# Patient Record
Sex: FEMALE | Race: BLACK OR AFRICAN AMERICAN
[De-identification: names, ages, dates, MRNs, and addresses within clinical notes are randomized per-mention and may not be internally consistent; named-entity substitution may affect disease eponyms.]

---

## 2022-12-14 ENCOUNTER — HOSPITAL ENCOUNTER (INPATIENT)
Dept: HOSPITAL 47 - EC | Age: 67
LOS: 13 days | Discharge: HOME | DRG: 57 | End: 2022-12-27
Attending: PSYCHIATRY & NEUROLOGY | Admitting: PSYCHIATRY & NEUROLOGY
Payer: MEDICARE

## 2022-12-14 VITALS — BODY MASS INDEX: 17.3 KG/M2

## 2022-12-14 DIAGNOSIS — G30.9: Primary | ICD-10-CM

## 2022-12-14 DIAGNOSIS — T43.595A: ICD-10-CM

## 2022-12-14 DIAGNOSIS — Z79.899: ICD-10-CM

## 2022-12-14 DIAGNOSIS — Z87.891: ICD-10-CM

## 2022-12-14 DIAGNOSIS — Z20.822: ICD-10-CM

## 2022-12-14 DIAGNOSIS — R00.1: ICD-10-CM

## 2022-12-14 DIAGNOSIS — F32.A: ICD-10-CM

## 2022-12-14 DIAGNOSIS — I10: ICD-10-CM

## 2022-12-14 DIAGNOSIS — F41.9: ICD-10-CM

## 2022-12-14 DIAGNOSIS — F06.71: ICD-10-CM

## 2022-12-14 DIAGNOSIS — G47.00: ICD-10-CM

## 2022-12-14 LAB
ANION GAP SERPL CALC-SCNC: 9 MMOL/L
APAP SPEC-MCNC: <10 UG/ML
BASOPHILS # BLD AUTO: 0.1 K/UL (ref 0–0.2)
BASOPHILS NFR BLD AUTO: 1 %
BUN SERPL-SCNC: 6 MG/DL (ref 7–17)
CALCIUM SPEC-MCNC: 9.5 MG/DL (ref 8.4–10.2)
CHLORIDE SERPL-SCNC: 106 MMOL/L (ref 98–107)
CO2 SERPL-SCNC: 28 MMOL/L (ref 22–30)
EOSINOPHIL # BLD AUTO: 0.1 K/UL (ref 0–0.7)
EOSINOPHIL NFR BLD AUTO: 1 %
ERYTHROCYTE [DISTWIDTH] IN BLOOD BY AUTOMATED COUNT: 4.38 M/UL (ref 3.8–5.4)
ERYTHROCYTE [DISTWIDTH] IN BLOOD: 13.1 % (ref 11.5–15.5)
GLUCOSE SERPL-MCNC: 99 MG/DL (ref 74–99)
HCT VFR BLD AUTO: 40.3 % (ref 34–46)
HGB BLD-MCNC: 13.4 GM/DL (ref 11.4–16)
LYMPHOCYTES # SPEC AUTO: 2.2 K/UL (ref 1–4.8)
LYMPHOCYTES NFR SPEC AUTO: 27 %
MCH RBC QN AUTO: 30.5 PG (ref 25–35)
MCHC RBC AUTO-ENTMCNC: 33.2 G/DL (ref 31–37)
MCV RBC AUTO: 92.1 FL (ref 80–100)
MONOCYTES # BLD AUTO: 0.5 K/UL (ref 0–1)
MONOCYTES NFR BLD AUTO: 7 %
NEUTROPHILS # BLD AUTO: 5 K/UL (ref 1.3–7.7)
NEUTROPHILS NFR BLD AUTO: 62 %
PH UR: 5 [PH] (ref 5–8)
PLATELET # BLD AUTO: 307 K/UL (ref 150–450)
POTASSIUM SERPL-SCNC: 3.8 MMOL/L (ref 3.5–5.1)
RBC UR QL: 1 /HPF (ref 0–5)
SALICYLATES SERPL-MCNC: <1 MG/DL
SODIUM SERPL-SCNC: 143 MMOL/L (ref 137–145)
SP GR UR: 1.01 (ref 1–1.03)
SQUAMOUS UR QL AUTO: <1 /HPF (ref 0–4)
UROBILINOGEN UR QL STRIP: <2 MG/DL (ref ?–2)
WBC # BLD AUTO: 8 K/UL (ref 3.8–10.6)
WBC # UR AUTO: 2 /HPF (ref 0–5)

## 2022-12-14 PROCEDURE — 81001 URINALYSIS AUTO W/SCOPE: CPT

## 2022-12-14 PROCEDURE — 82746 ASSAY OF FOLIC ACID SERUM: CPT

## 2022-12-14 PROCEDURE — 82607 VITAMIN B-12: CPT

## 2022-12-14 PROCEDURE — 80048 BASIC METABOLIC PNL TOTAL CA: CPT

## 2022-12-14 PROCEDURE — 87635 SARS-COV-2 COVID-19 AMP PRB: CPT

## 2022-12-14 PROCEDURE — 36415 COLL VENOUS BLD VENIPUNCTURE: CPT

## 2022-12-14 PROCEDURE — 80306 DRUG TEST PRSMV INSTRMNT: CPT

## 2022-12-14 PROCEDURE — 84443 ASSAY THYROID STIM HORMONE: CPT

## 2022-12-14 PROCEDURE — 99285 EMERGENCY DEPT VISIT HI MDM: CPT

## 2022-12-14 PROCEDURE — 80143 DRUG ASSAY ACETAMINOPHEN: CPT

## 2022-12-14 PROCEDURE — 85025 COMPLETE CBC W/AUTO DIFF WBC: CPT

## 2022-12-14 PROCEDURE — 82075 ASSAY OF BREATH ETHANOL: CPT

## 2022-12-14 PROCEDURE — 80320 DRUG SCREEN QUANTALCOHOLS: CPT

## 2022-12-14 PROCEDURE — 83036 HEMOGLOBIN GLYCOSYLATED A1C: CPT

## 2022-12-14 PROCEDURE — 80179 DRUG ASSAY SALICYLATE: CPT

## 2022-12-14 NOTE — ED
Psych HPI





- General


Chief Complaint: Psychiatric Symptoms


Stated Complaint: mental health


Time Seen by Provider: 12/14/22 16:23


Source: family


Mode of arrival: ambulatory





- History of Present Illness


Initial Comments: 





This 67-year-old demented female presents with daughter with the complaint of 

hallucinations.  The patient lives with daughter.  She apparently has declined 

over the past 5 weeks.  She has been having some visual hallucinations.  She's 

been quite agitated at home and destructive.  Daughter states that this degree 

of abnormal behavior is quite out of context for her.  She apparently was 

hospitalized for approximately 4 days at [facility] about a month ago.  

Her medications were adjusted but this did not help her symptomatology at all.  

She otherwise is healthy other than hypertension.  She has no known physical 

complaints.  Daughter denies any recent illnesses or fever.  Patient does not 

give any degree of relevant history herself.  History is therefore somewhat 

limited.  No other complaints or modifying factors identified.





- Related Data


                                Home Medications











 Medication  Instructions  Recorded  Confirmed


 


Cholecalciferol [Vitamin D3 (25 50 mcg PO DAILY 12/14/22 12/14/22





Mcg = 1000 Iu)]   


 


Donepezil [Aricept] 10 mg PO DAILY 12/14/22 12/14/22


 


QUEtiapine [SEROquel] 50 mg PO BID 12/14/22 12/14/22


 


amLODIPine [Norvasc] 10 mg PO DAILY 12/14/22 12/14/22


 


atenoloL [Tenormin] 25 mg PO BID 12/14/22 12/14/22











                                    Allergies











Allergy/AdvReac Type Severity Reaction Status Date / Time


 


No Known Allergies Allergy   Verified 12/14/22 21:23














Review of Systems


ROS Statement: 


Those systems with pertinent positive or pertinent negative responses have been 

documented in the HPI.





ROS Other: All systems not noted in ROS Statement are negative.





Past Medical History


Past Medical History: Dementia, Hypertension


History of Any Multi-Drug Resistant Organisms: None Reported


Past Surgical History: No Surgical Hx Reported


Past Psychological History: No Psychological Hx Reported


Smoking Status: Former smoker


Past Alcohol Use History: None Reported


Past Drug Use History: None Reported





General Exam





- General Exam Comments


Initial Comments: 





GENERAL: The patient is well nourished and well hydrated. 


VITAL SIGNS: Heart rate, blood pressure, respiratory rate reviewed as recorded 

in nurse's notes. 


EYES: Pupils are round and reactive. Extraocular movements are intact. No 

conjunctival / lid redness or swelling. 


ENT: No external evidence of injury, swelling, or ecchymosis. Airway is patent. 

Throat is clear. 


NECK: Nontender. No swelling or evidence of injury. No subcutaneous emphysema. 

Trachea is midline. No thyroid mass. 


HEART: Regular rate and rhythm. Good peripheral pulses. 


LUNGS/CHEST: Breath sounds clear and equal bilaterally. No rales, rhonchi, or 

wheezes. No ecchymosis, subcutaneous emphysema, or tenderness. 


ABDOMEN: Abdomen soft without tenderness. No palpable masses or organomegaly. No

 peritoneal signs. No abdominal wall swelling or ecchymosis. 


EXTREMITIES: No extremity tenderness. Normal muscle tone and function. No 

thoracolumbar tenderness. 


NEUROLOGIC: Sensation is grossly intact. Cranial nerve exam reveals face is 

symmetrical, tongue is midline, speech is clear. 


SKIN: No abrasions or ecchymosis is noted. No induration or masses noted. 


PSYCHIATRIC: Alert and pleasant, no acute psychiatric distress.  Patient appears

 demented.





Limitations: altered mental status





Course





                                   Vital Signs











  12/14/22





  14:39


 


Temperature 98.4 F


 


Pulse Rate 80


 


Respiratory 20





Rate 


 


Blood Pressure 125/68


 


O2 Sat by Pulse 100





Oximetry 














Medical Decision Making





- Medical Decision Making





The patient was seen and examined.  All diagnostics are reviewed.  No acute 

abnormalities were noted.  The psychiatric screening labs.  The exact cause of 

her symptomatology is not definitively determine but it is felt as though it 

likely is psychiatric in nature.  The mental health consult is placed.  Case is 

discussed with the psychiatric nurse and they would like to admit patient to the

 psychiatry unit for further treatment.  They are requesting a covid 19 test and

 this is ordered.





- Lab Data


Result diagrams: 


                                 12/14/22 19:52





                                 12/14/22 19:52





                                   Lab Results











  12/14/22 12/14/22 12/14/22 Range/Units





  19:52 19:52 19:52 


 


WBC  8.0    (3.8-10.6)  k/uL


 


RBC  4.38    (3.80-5.40)  m/uL


 


Hgb  13.4    (11.4-16.0)  gm/dL


 


Hct  40.3    (34.0-46.0)  %


 


MCV  92.1    (80.0-100.0)  fL


 


MCH  30.5    (25.0-35.0)  pg


 


MCHC  33.2    (31.0-37.0)  g/dL


 


RDW  13.1    (11.5-15.5)  %


 


Plt Count  307    (150-450)  k/uL


 


MPV  8.5    


 


Neutrophils %  62    %


 


Lymphocytes %  27    %


 


Monocytes %  7    %


 


Eosinophils %  1    %


 


Basophils %  1    %


 


Neutrophils #  5.0    (1.3-7.7)  k/uL


 


Lymphocytes #  2.2    (1.0-4.8)  k/uL


 


Monocytes #  0.5    (0-1.0)  k/uL


 


Eosinophils #  0.1    (0-0.7)  k/uL


 


Basophils #  0.1    (0-0.2)  k/uL


 


Sodium    143  (137-145)  mmol/L


 


Potassium    3.8  (3.5-5.1)  mmol/L


 


Chloride    106  ()  mmol/L


 


Carbon Dioxide    28  (22-30)  mmol/L


 


Anion Gap    9  mmol/L


 


BUN    6 L  (7-17)  mg/dL


 


Creatinine    0.80  (0.52-1.04)  mg/dL


 


Est GFR (CKD-EPI)AfAm    90  (>60 ml/min/1.73 sqM)  


 


Est GFR (CKD-EPI)NonAf    78  (>60 ml/min/1.73 sqM)  


 


Glucose    99  (74-99)  mg/dL


 


Calcium    9.5  (8.4-10.2)  mg/dL


 


Urine Color     


 


Urine Appearance     (Clear)  


 


Urine pH     (5.0-8.0)  


 


Ur Specific Gravity     (1.001-1.035)  


 


Urine Protein     (Negative)  


 


Urine Glucose (UA)     (Negative)  


 


Urine Ketones     (Negative)  


 


Urine Blood     (Negative)  


 


Urine Nitrite     (Negative)  


 


Urine Bilirubin     (Negative)  


 


Urine Urobilinogen     (<2.0)  mg/dL


 


Ur Leukocyte Esterase     (Negative)  


 


Urine RBC     (0-5)  /hpf


 


Urine WBC     (0-5)  /hpf


 


Ur Squamous Epith Cells     (0-4)  /hpf


 


Urine Mucus     (None)  /hpf


 


Salicylates    <1.0  mg/dL


 


Urine Opiates Screen   Not Detected   (NotDetected)  


 


Ur Oxycodone Screen   Not Detected   (NotDetected)  


 


Urine Methadone Screen   Not Detected   (NotDetected)  


 


Ur Propoxyphene Screen   Not Detected   (NotDetected)  


 


Acetaminophen    <10.0  ug/mL


 


Ur Barbiturates Screen   Not Detected   (NotDetected)  


 


U Tricyclic Antidepress   Detected H   (NotDetected)  


 


Ur Phencyclidine Scrn   Not Detected   (NotDetected)  


 


Ur Amphetamines Screen   Not Detected   (NotDetected)  


 


U Methamphetamines Scrn   Not Detected   (NotDetected)  


 


U Benzodiazepines Scrn   Not Detected   (NotDetected)  


 


Urine Cocaine Screen   Not Detected   (NotDetected)  


 


U Marijuana (THC) Screen   Not Detected   (NotDetected)  


 


Serum Alcohol    <10  mg/dL














  12/14/22 Range/Units





  19:52 


 


WBC   (3.8-10.6)  k/uL


 


RBC   (3.80-5.40)  m/uL


 


Hgb   (11.4-16.0)  gm/dL


 


Hct   (34.0-46.0)  %


 


MCV   (80.0-100.0)  fL


 


MCH   (25.0-35.0)  pg


 


MCHC   (31.0-37.0)  g/dL


 


RDW   (11.5-15.5)  %


 


Plt Count   (150-450)  k/uL


 


MPV   


 


Neutrophils %   %


 


Lymphocytes %   %


 


Monocytes %   %


 


Eosinophils %   %


 


Basophils %   %


 


Neutrophils #   (1.3-7.7)  k/uL


 


Lymphocytes #   (1.0-4.8)  k/uL


 


Monocytes #   (0-1.0)  k/uL


 


Eosinophils #   (0-0.7)  k/uL


 


Basophils #   (0-0.2)  k/uL


 


Sodium   (137-145)  mmol/L


 


Potassium   (3.5-5.1)  mmol/L


 


Chloride   ()  mmol/L


 


Carbon Dioxide   (22-30)  mmol/L


 


Anion Gap   mmol/L


 


BUN   (7-17)  mg/dL


 


Creatinine   (0.52-1.04)  mg/dL


 


Est GFR (CKD-EPI)AfAm   (>60 ml/min/1.73 sqM)  


 


Est GFR (CKD-EPI)NonAf   (>60 ml/min/1.73 sqM)  


 


Glucose   (74-99)  mg/dL


 


Calcium   (8.4-10.2)  mg/dL


 


Urine Color  Light Yellow  


 


Urine Appearance  Clear  (Clear)  


 


Urine pH  5.0  (5.0-8.0)  


 


Ur Specific Gravity  1.006  (1.001-1.035)  


 


Urine Protein  Negative  (Negative)  


 


Urine Glucose (UA)  Negative  (Negative)  


 


Urine Ketones  Negative  (Negative)  


 


Urine Blood  Trace H  (Negative)  


 


Urine Nitrite  Negative  (Negative)  


 


Urine Bilirubin  Negative  (Negative)  


 


Urine Urobilinogen  <2.0  (<2.0)  mg/dL


 


Ur Leukocyte Esterase  Negative  (Negative)  


 


Urine RBC  1  (0-5)  /hpf


 


Urine WBC  2  (0-5)  /hpf


 


Ur Squamous Epith Cells  <1  (0-4)  /hpf


 


Urine Mucus  Rare H  (None)  /hpf


 


Salicylates   mg/dL


 


Urine Opiates Screen   (NotDetected)  


 


Ur Oxycodone Screen   (NotDetected)  


 


Urine Methadone Screen   (NotDetected)  


 


Ur Propoxyphene Screen   (NotDetected)  


 


Acetaminophen   ug/mL


 


Ur Barbiturates Screen   (NotDetected)  


 


U Tricyclic Antidepress   (NotDetected)  


 


Ur Phencyclidine Scrn   (NotDetected)  


 


Ur Amphetamines Screen   (NotDetected)  


 


U Methamphetamines Scrn   (NotDetected)  


 


U Benzodiazepines Scrn   (NotDetected)  


 


Urine Cocaine Screen   (NotDetected)  


 


U Marijuana (THC) Screen   (NotDetected)  


 


Serum Alcohol   mg/dL














Disposition


Clinical Impression: 


 Auditory hallucinations, Dementia





Disposition: ADMITTED AS IP TO THIS [facility]


Condition: Fair


Is patient prescribed a controlled substance at d/c from ED?: No


Referrals: 


Nonstaff,Physician [Primary Care Provider] - 1-2 days


Time of Disposition: 21:51


Decision Date: 12/14/22


Decision Time: 21:51

## 2022-12-15 RX ADMIN — DONEPEZIL HYDROCHLORIDE SCH MG: 10 TABLET ORAL at 11:49

## 2022-12-15 RX ADMIN — Medication SCH MG: at 20:21

## 2022-12-15 RX ADMIN — Medication SCH: at 08:59

## 2022-12-15 RX ADMIN — DONEPEZIL HYDROCHLORIDE SCH MG: 10 TABLET ORAL at 08:56

## 2022-12-15 RX ADMIN — Medication SCH MCG: at 08:55

## 2022-12-15 RX ADMIN — DONEPEZIL HYDROCHLORIDE SCH: 10 TABLET ORAL at 08:59

## 2022-12-15 NOTE — P.PN
Progress Note - Text


Progress Note Date: 12/15/22





patient is sleeping and could not be seen at this time for medical evaluation

## 2022-12-15 NOTE — P.HP
Psychiatric H&P





- .


H&P Date: 12/15/22


History & Physical: 


                                    Allergies











Allergy/AdvReac Type Severity Reaction Status Date / Time


 


No Known Allergies Allergy   Verified 12/14/22 21:23








                                   Vital Signs











Temp  97.0 F L  12/15/22 09:00


 


Pulse  77   12/15/22 09:00


 


Resp  16   12/15/22 09:00


 


BP  117/65   12/15/22 09:00


 


Pulse Ox  99   12/14/22 23:43


 


FiO2      








                                 Intake & Output











 12/14/22 12/15/22 12/15/22





 18:59 06:59 18:59


 


Weight 49.895 kg 48.704 kg 








                             Laboratory Last Values











WBC  8.0 k/uL (3.8-10.6)   12/14/22  19:52    


 


RBC  4.38 m/uL (3.80-5.40)   12/14/22  19:52    


 


Hgb  13.4 gm/dL (11.4-16.0)   12/14/22  19:52    


 


Hct  40.3 % (34.0-46.0)   12/14/22  19:52    


 


MCV  92.1 fL (80.0-100.0)   12/14/22  19:52    


 


MCH  30.5 pg (25.0-35.0)   12/14/22  19:52    


 


MCHC  33.2 g/dL (31.0-37.0)   12/14/22  19:52    


 


RDW  13.1 % (11.5-15.5)   12/14/22  19:52    


 


Plt Count  307 k/uL (150-450)   12/14/22  19:52    


 


MPV  8.5   12/14/22  19:52    


 


Neutrophils %  62 %  12/14/22  19:52    


 


Lymphocytes %  27 %  12/14/22  19:52    


 


Monocytes %  7 %  12/14/22  19:52    


 


Eosinophils %  1 %  12/14/22  19:52    


 


Basophils %  1 %  12/14/22  19:52    


 


Neutrophils #  5.0 k/uL (1.3-7.7)   12/14/22  19:52    


 


Lymphocytes #  2.2 k/uL (1.0-4.8)   12/14/22  19:52    


 


Monocytes #  0.5 k/uL (0-1.0)   12/14/22  19:52    


 


Eosinophils #  0.1 k/uL (0-0.7)   12/14/22  19:52    


 


Basophils #  0.1 k/uL (0-0.2)   12/14/22  19:52    


 


Sodium  143 mmol/L (137-145)   12/14/22  19:52    


 


Potassium  3.8 mmol/L (3.5-5.1)   12/14/22  19:52    


 


Chloride  106 mmol/L ()   12/14/22  19:52    


 


Carbon Dioxide  28 mmol/L (22-30)   12/14/22  19:52    


 


Anion Gap  9 mmol/L  12/14/22  19:52    


 


BUN  6 mg/dL (7-17)  L  12/14/22  19:52    


 


Creatinine  0.80 mg/dL (0.52-1.04)   12/14/22  19:52    


 


Est GFR (CKD-EPI)AfAm  90  (>60 ml/min/1.73 sqM)   12/14/22  19:52    


 


Est GFR (CKD-EPI)NonAf  78  (>60 ml/min/1.73 sqM)   12/14/22  19:52    


 


Glucose  99 mg/dL (74-99)   12/14/22  19:52    


 


Estimated Ave Glu mg/dL  128   12/14/22  19:52    


 


Hemoglobin A1c  6.1 % (0.0-6.0)  H  12/14/22  19:52    


 


Calcium  9.5 mg/dL (8.4-10.2)   12/14/22  19:52    


 


TSH  1.780 mIU/L (0.465-4.680)   12/14/22  19:52    


 


Urine Color  Light Yellow   12/14/22  19:52    


 


Urine Appearance  Clear  (Clear)   12/14/22  19:52    


 


Urine pH  5.0  (5.0-8.0)   12/14/22  19:52    


 


Ur Specific Gravity  1.006  (1.001-1.035)   12/14/22  19:52    


 


Urine Protein  Negative  (Negative)   12/14/22  19:52    


 


Urine Glucose (UA)  Negative  (Negative)   12/14/22  19:52    


 


Urine Ketones  Negative  (Negative)   12/14/22  19:52    


 


Urine Blood  Trace  (Negative)  H  12/14/22  19:52    


 


Urine Nitrite  Negative  (Negative)   12/14/22  19:52    


 


Urine Bilirubin  Negative  (Negative)   12/14/22  19:52    


 


Urine Urobilinogen  <2.0 mg/dL (<2.0)   12/14/22  19:52    


 


Ur Leukocyte Esterase  Negative  (Negative)   12/14/22  19:52    


 


Urine RBC  1 /hpf (0-5)   12/14/22  19:52    


 


Urine WBC  2 /hpf (0-5)   12/14/22  19:52    


 


Ur Squamous Epith Cells  <1 /hpf (0-4)   12/14/22  19:52    


 


Urine Mucus  Rare /hpf (None)  H  12/14/22  19:52    


 


Salicylates  <1.0 mg/dL  12/14/22  19:52    


 


Urine Opiates Screen  Not Detected  (NotDetected)   12/14/22  19:52    


 


Ur Oxycodone Screen  Not Detected  (NotDetected)   12/14/22  19:52    


 


Urine Methadone Screen  Not Detected  (NotDetected)   12/14/22  19:52    


 


Ur Propoxyphene Screen  Not Detected  (NotDetected)   12/14/22  19:52    


 


Acetaminophen  <10.0 ug/mL  12/14/22  19:52    


 


Ur Barbiturates Screen  Not Detected  (NotDetected)   12/14/22  19:52    


 


U Tricyclic Antidepress  Detected  (NotDetected)  H  12/14/22  19:52    


 


Ur Phencyclidine Scrn  Not Detected  (NotDetected)   12/14/22  19:52    


 


Ur Amphetamines Screen  Not Detected  (NotDetected)   12/14/22  19:52    


 


U Methamphetamines Scrn  Not Detected  (NotDetected)   12/14/22  19:52    


 


U Benzodiazepines Scrn  Not Detected  (NotDetected)   12/14/22  19:52    


 


Urine Cocaine Screen  Not Detected  (NotDetected)   12/14/22  19:52    


 


U Marijuana (THC) Screen  Not Detected  (NotDetected)   12/14/22  19:52    


 


Serum Alcohol  <10 mg/dL  12/14/22  19:52    


 


Coronavirus (PCR)  Not Detected  (Not Detectd)   12/14/22  21:59    











12/15/22 13:19





12/15/22 13:19


IDENTIFYING DATA: Patient is a 67-year-old single, retired, -American 

female who presented to our hospital brought in by family for visual 

hallucinations.





HPI: Patient presented to the hospital on 12/14/2022, brought in by her daughter

with a chief complaint of hallucinations.  Reportedly, the patient has been 

declining over the past 5 weeks.  She was noted by her daughter to be quite 

agitated at home and at times destructive.  The patient was hospitalized for 

approximately 4 days at [place] approximately a month ago and medications were 

adjusted however this did not help with her symptoms.  The patient was also 

recently in a nursing home however was signed out AGAINST MEDICAL ADVICE by the 

patient's daughter who felt that the nursing home was inappropriate and not 

providing the care that the patient needed.


The patient was diagnosed with major neurocognitive disorder in 2018.  She was r

ecently started on donepezil November 2022.  As per EPS report, the patient was 

started on Seroquel and this was increased in order to address her insomnia and 

behavioral disturbances.  However, this medication did not help and the patient 

has been displaying behavior such as yelling, cursing, and throwing objects 

since November.  Reportedly, the patient has been hallucinating and seeing "a 

man or woman who thinks they are trying to take off her shoes, take her stuff, 

or trying to fight her."  The patient was subsequently admitted on to the 

psychiatric unit.


The patient is currently on one-to-one supervision for falls.  Upon evaluation 

by this provider, the patient is very suspicious stating that everyone here is 

attempting to steal her money.  She is currently alert and oriented to self o

nly.  She has been noted by this provider and by staff to attempt to disrobe 

while in the milieu.  She has had intermittent episodes of agitation, crying, 

and yelling.  The patient did require multiple IM medications yesterday in order

to sleep.  The patient has been noted to sleep very poorly.  At baseline, the 

patient is noted to be able to bathe and care for her needs with her daughter 

assisting when necessary.  The patient is a very poor historian at this time.








PAST PSYCHIATRIC HISTORY: Patient has a history of dementia since 2018.  The 

patient's home medications include Seroquel however patient's daughter reported 

that this medication was not beneficial.  Unable to determine any previous 

psychiatric admissions.  Unable to determine any other psychiatric history as 

the patient is a poor historian.





PMH:


Past Medical History: Dementia, Hypertension


History of Any Multi-Drug Resistant Organisms: None Reported


Past Surgical History: No Surgical Hx Reported


Past Psychological History: No Psychological Hx Reported


Smoking Status: Former smoker


Past Alcohol Use History: None Reported


Past Drug Use History: None Reported





ALLERGIES: NO KNOWN DRUG ALLERGIES





CHEMICAL DEPENDENCY HISTORY: As per chart review, the patient is to drink beers 

every day but has not had a beer in the past 3 months.  No other substance use 

history could be ascertained.





FAMILY PSYCHIATRIC/SUBSTANCE USE HISTORY: Unknown at this time.





SOCIAL HISTORY: Patient reports that she is from [place].  Her guardian is her 

daughter Carmen.  Patient lives with her daughter.





MENTAL STATUS EXAM: 


General Appearance: Patient appears to be stated age is alert, however not 

directable or cooperative.  Fair hygiene and grooming.


Behavior: Patient is standing in the hallway.  Eye contact is intense.


Speech: Patient's speech is spontaneous, rapid in volume, and often loud.


Mood/Affect: Patient reports their mood is "I want to go," affect is congruent 

and irritable


Suicidality/Homicidality: Denies


Perceptions: Denies


Though content/process: [place].


Memory and concentration: Patient was alert and oriented to person only.  

Concentration is grossly poor.


Judgment and insight: Poor





STRENGTHS/WEAKNESSES: Strength is that the patient has a supportive daughter.  

Weakness is that the patient has very poor insight and judgment due to advanced 

dementia





INTELLECT: average





IMPRESSIONS: 


Major neurocognitive disorder with behavioral disturbances





PLAN: 


-Patient is admitted under involuntary status to MHU for stabilization of 

psychiatric symptoms and safety.  A second certification was completed and along

with petition will be filed for court.


-Medications : 


Increase Seroquel to 25 mg at 9 AM, 4 PM, and 50 mg at bedtime for dementia with

behavioral disturbances.


-Zyprexa PRN for agitation/aggression


-Patient was unable to fully participate in the informed consent conversation 

due to her advanced dementia.


-Internal Medicine consult to perform medical evaluation and physical.


-SW on board for discharge planning. Encourage patient to participate in groups 

to work on coping skills. 


12/15/22 13:19





12/15/22 13:19

## 2022-12-16 RX ADMIN — Medication SCH MCG: at 08:55

## 2022-12-16 RX ADMIN — Medication SCH MG: at 20:57

## 2022-12-16 RX ADMIN — DONEPEZIL HYDROCHLORIDE SCH MG: 10 TABLET ORAL at 08:55

## 2022-12-16 NOTE — P.PN
Progress Note - Text


Progress Note Date: 12/16/22





patient starting yelling and refused evaluation by the medical doctor

## 2022-12-16 NOTE — P.PN
Progress Note - Text


Progress Note Date: 12/16/22





Interval History:


Patient was seen in the common area accompanied by her 1-1 and was directable 

and agreeable to speak with this provider. Currently, the patient continues to 

be alert and oriented to self only. She believes she is in [place]. She however 

has been able to eat her meals and is noted by staff to be calmer and more 

cooperative. She has attended group. She did however try to eat a puzzle piece. 

Otherwise the patient has been taking her medications and reported no medical 

issues or concerns. She has been noted to sleep well. 





Mental Status Exam:


General Appearance: Patient appears to be stated age is alert, mostly 

directable, and cooperative. 


Behavior: Patient is calmly seated without any agitated behavior. Eye contact is

appropriate. 


Speech: Patient's speech is fluent and nonpressured. Nonspontaneous. 


Mood/Affect: Mood is improving mildly, affect is congruent and constricted. 


Suicidality/Homicidality:  Patient denies having any suicidal or homicidal 

ideation intent or plan.  


Perceptions: Patient denies any visual hallucinations and denies any auditory 

hallucinations  


Though content/process: [place]. Linear in conversation. 


Memory and concentration: AOX1 - Self only. Concentration grossly intact but 

baseline poor.  


Judgment and insight: Poor at baseline.





                                   Vital Signs











Temp  98.1 F   12/16/22 04:57


 


Pulse  59 L  12/16/22 04:57


 


Resp  18   12/16/22 04:57


 


BP  133/75   12/16/22 04:57


 


Pulse Ox  98   12/16/22 04:57


 


FiO2      








                                 Intake & Output











 12/15/22 12/16/22 12/16/22





 18:59 06:59 18:59


 


Weight 48.704 kg  








                       Laboratory Results - Last 24 Hours











  12/14/22 12/14/22





  19:52 19:52


 


Vitamin B12  395.0 


 


Folate   6.60











Assessment


Major neurocognitive disorder with behavioral disturbances





Plan:


-Patient continues to meet criteria for inpatient psychiatric admission for 

symptom stabilization and safety. Patient has been petitioned and certified. 


-Medications: 


Continue Seroquel 25 mg at 9 AM, 4 PM, and 50 mg at bedtime for dementia with 

behavioral disturbances.


Melatonin 10 mg at bedtime for insomnia


-When necessary Zyprexa for agitation/aggression.


-SW on board for discharge planning.  Encouraged the patient to participate in 

milieu.

## 2022-12-17 RX ADMIN — Medication SCH MCG: at 08:18

## 2022-12-17 RX ADMIN — DONEPEZIL HYDROCHLORIDE SCH MG: 10 TABLET ORAL at 08:59

## 2022-12-17 RX ADMIN — Medication SCH: at 21:00

## 2022-12-17 NOTE — P.PN
Progress Note - Text


Progress Note Date: 12/17/22





Interval History:


Patient was seen in the common area accompanied by her 1-1 and was directable 

and agreeable to speak with this provider. Currently, the patient continues to 

be alert and oriented to self only. She believes she is with in her sister's 

home. She however has been able to eat her meals and is noted by staff to be 

calmer and more cooperative. She has attended group. She did however try to eat 

a puzzle piece. Otherwise the patient has been taking her medications and 

reported no medical issues or concerns.  Patient expressed that she believed 

staff was hitting her yesterday evening, requiring Zyprexa 5 mg for agitation. 

Of note, patient was noted to be up and had trouble sleeping overnight.  

Therefore, nighttime dose of Seroquel was increased and patient tolerated it 

well. She denies side effects to the medications and does not display any.





Mental Status Exam:


General Appearance: Patient appears to be stated age is alert, mostly 

directable, and cooperative. 


Behavior: Patient is calmly seated without any agitated behavior. Eye contact is

appropriate. 


Speech: Patient's speech is fluent and nonpressured. Nonspontaneous. 


Mood/Affect: Mood is improving mildly, affect is congruent and constricted. 


Suicidality/Homicidality:  Patient denies having any suicidal or homicidal 

ideation intent or plan.  


Perceptions: Patient denies any visual hallucinations and denies any auditory 

hallucinations  


Though content/process: Confabulates. Linear in conversation. 


Memory and concentration: AOX1 - Self only. Concentration grossly intact but 

baseline poor.  


Judgment and insight: Poor at baseline.





AIMS = 0





Assessment


Major neurocognitive disorder with behavioral disturbances





Plan:


-Patient continues to meet criteria for inpatient psychiatric admission for 

symptom stabilization and safety. Patient has been petitioned and certified. 


-Medications: 


Increase Seroquel 25 mg at 9 AM, 4 PM, and to 75 mg at bedtime for dementia with

behavioral disturbances.


Melatonin 10 mg at bedtime for insomnia


-When necessary Zyprexa for agitation/aggression.


-SW on board for discharge planning.  Encouraged the patient to participate in 

milieu.

## 2022-12-18 RX ADMIN — Medication SCH MCG: at 09:05

## 2022-12-18 RX ADMIN — DONEPEZIL HYDROCHLORIDE SCH MG: 10 TABLET ORAL at 09:05

## 2022-12-18 RX ADMIN — Medication SCH MG: at 20:07

## 2022-12-18 RX ADMIN — ACETAMINOPHEN PRN MG: 325 TABLET, FILM COATED ORAL at 17:06

## 2022-12-18 NOTE — P.PN
Progress Note - Text


Progress Note Date: 12/18/22





Interval History:


Patient was seen in the common area accompanied by her 1-1 and was directable 

and agreeable to speak with this provider.  She is a poor historian with 

disorganized thoughts. Currently, the patient continues to be alert and oriented

to self only. She believes she is in "towndown".  After she had received Zyprexa

yesterday, patient was calmer and more cooperative during the daytime.  However,

she appears to be more worried today.  She keeps talking about "her "who is 

disturbing her and appears to be responding to internal stimuli.  She states 

that "she" has been moving her toilet paper in the bathroom.  She becomes 

irritable/agitated while discussing "her " and refers to her as "b****". As a 

result, patient was given Zyprexa 5 mg by mouth this morning.  Otherwise the 

patient has been taking her medications and reported no medical issues or 

concerns.  She denies side effects to the medications and does not display any.





Mental Status Exam:


General Appearance: Patient appears to be stated age is alert, mostly 

directable, and cooperative. 


Behavior: Patient is pacing. Eye contact is fair. She is noted to be becoming 

restless when discussing "her"


Speech: Patient's speech is fluent and nonpressured. Nonspontaneous. 


Mood/Affect: Mood is improving mildly but becomes irritable, affect is congruent

and constricted. 


Suicidality/Homicidality:  Patient denies having any suicidal or homicidal 

ideation intent or plan.  


Perceptions: Responding to internal stimuli 


Though content/process: Confabulates. Linear in conversation. 


Memory and concentration: AOX1 - Self only. Concentration grossly intact but 

baseline poor.  


Judgment and insight: Poor at baseline.





AIMS = 1


Unable to draw a clock. Draws a [place] with a straight line next to it. When 

asked to write her name, just scribbles a straight vertical line.





Assessment


Major neurocognitive disorder with behavioral disturbances - hallucinations 

likely secondary to MNCD rather than primary thought disorder





Plan:


-Patient continues to meet criteria for inpatient psychiatric admission for 

symptom stabilization and safety. Patient has been petitioned and certified. 


-Medications: 


Increase Seroquel to 50 mg at 9 AM, 25 mg at 4 PM, and 75 mg at bedtime for 

dementia with behavioral disturbances.


Aricept 10 mg daily for dementia


Melatonin 10 mg at bedtime for insomnia


-When necessary Zyprexa for agitation/aggression.


-SW on board for discharge planning.  Encouraged the patient to participate in 

milieu.

## 2022-12-18 NOTE — P.PN
Progress Note - Text


Progress Note Date: 12/18/22





The patient refused to be seen or be evaluated.

## 2022-12-19 RX ADMIN — DONEPEZIL HYDROCHLORIDE SCH MG: 10 TABLET ORAL at 08:07

## 2022-12-19 RX ADMIN — Medication SCH MG: at 20:31

## 2022-12-19 RX ADMIN — Medication SCH MCG: at 08:10

## 2022-12-19 RX ADMIN — ACETAMINOPHEN PRN ML: 160 SOLUTION ORAL at 21:14

## 2022-12-19 NOTE — P.PN
Progress Note - Text


Progress Note Date: 12/19/22








Interval History:


Patient was seen in the common area accompanied by her 1-1 and was directable 

and agreeable to speak with this provider.  The patient continues to be alert 

and oriented to self only.  When asking her about what date it is today, the 

patient reports "I know what date it is not today."  She continues to be 

observed by staff to respond to internal stimuli and often engaging in self 

talk.  She has had intermittent episodes of agitation and irritability however 

has been redirectable.  She has been able to care for hygiene and grooming and 

has been adherent with her medications.  She reports no issues regarding her 

medical help to this provider.  At times, the patient would be noted to speak to

people that are not present and often referred to them as "b**ches."





Mental Status Exam:


General Appearance: Patient appears to be stated age is alert, mostly 

directable, and cooperative. 


Behavior: Patient is pacing. Eye contact is fair.  Psychomotor activity appears 

normal.  Patient puts on her socks and takes them off and put back on during the

interview.


Speech: Patient's speech is fluent and nonpressured. Nonspontaneous. 


Mood/Affect: Mood is improving mildly but becomes irritable, affect is congruent

and constricted. 


Suicidality/Homicidality:  Patient denies having any suicidal or homicidal 

ideation intent or plan.  


Perceptions: Responding to internal stimuli 


Though content/process: Confabulates. Linear in conversation. 


Memory and concentration: AOX1 - Self only. Concentration grossly intact but 

baseline poor.  


Judgment and insight: Poor at baseline.





                                   Vital Signs











Temp  98.1 F   12/18/22 06:28


 


Pulse  52 L  12/19/22 08:10


 


Resp  14   12/18/22 06:28


 


BP  126/68   12/19/22 08:10


 


Pulse Ox  99   12/17/22 06:31


 


FiO2      








                                 Intake & Output











 12/18/22 12/19/22 12/19/22





 18:59 06:59 18:59


 


Weight   48.704 kg














AIMS = 1


Unable to draw a clock. Draws a [place] with a straight line next to it. When 

asked to write her name, just scribbles a straight vertical line.





Assessment


Major neurocognitive disorder with behavioral disturbances - hallucinations 

likely secondary to MNCD rather than primary thought disorder





Plan:


-Patient continues to meet criteria for inpatient psychiatric admission for 

symptom stabilization and safety. Patient has been petitioned and certified.  

Patient refused to defer mental health court.  


-Medications: 


Continue Seroquel 50 mg at 9 AM, 25 mg at 4 PM, and 75 mg at bedtime for 

dementia with behavioral disturbances.


Aricept 10 mg daily for dementia


Melatonin 10 mg at bedtime for insomnia


-When necessary Zyprexa for agitation/aggression.


-SW on board for discharge planning.  Encouraged the patient to participate in 

milieu.

## 2022-12-20 RX ADMIN — Medication SCH MG: at 20:02

## 2022-12-20 RX ADMIN — Medication SCH MCG: at 08:28

## 2022-12-20 RX ADMIN — DONEPEZIL HYDROCHLORIDE SCH MG: 10 TABLET ORAL at 08:28

## 2022-12-20 NOTE — P.PN
Progress Note - Text


Progress Note Date: 12/20/22








Interval History:


Patient was seen in her room, sleeping comfortably accompanied by her one-to-one

supervisor.  Currently, the patient has been noted to be cooperative and not 

engaging in any agitated or bizarre behaviors.  She is quite somnolent at this 

time and the treatment team feels that it is more therapeutic to have her 

sleeping currently.  She was reported to sleep well throughout the night.  She 

was given when necessary Zyprexa last night after she was noted to be responding

to internal stimuli stating "you nasty bitch", "she's always stealing, you ain't

going to hurt me".





Mental Status Exam:


General Appearance: Patient appears to be stated age is alert, mostly 

directable, and cooperative.  Somnolent and comfortably sleeping at this time.  


Behavior: Patient is calmly sleeping.


Speech: Unable to assess.


Mood/Affect: Unable to assess.


Suicidality/Homicidality:  Unable to assess.


Perceptions: Unable to assess.


Though content/process: Unable to assess.


Memory and concentration: AOX1 - Self only. Concentration grossly intact but 

baseline poor.  


Judgment and insight: Poor at baseline.





                                   Vital Signs











Temp  98.1 F   12/18/22 06:28


 


Pulse  52 L  12/19/22 08:10


 


Resp  14   12/18/22 06:28


 


BP  126/68   12/19/22 08:10


 


Pulse Ox  99   12/17/22 06:31


 


FiO2      








                                 Intake & Output











 12/19/22 12/20/22 12/20/22





 18:59 06:59 18:59


 


Weight 48.704 kg  














AIMS = 1


Unable to draw a clock. Draws a [place] with a straight line next to it. When 

asked to write her name, just scribbles a straight vertical line.





Assessment


Major neurocognitive disorder with behavioral disturbances - hallucinations 

likely secondary to MNCD rather than primary thought disorder





Plan:


-Patient continues to meet criteria for inpatient psychiatric admission for 

symptom stabilization and safety. Patient has been petitioned and certified.  

Patient refused to defer mental health court.  Court scheduled for 12/22/2022.


-Medications: 


Continue Seroquel 50 mg at 9 AM, 25 mg at 4 PM, and 75 mg at bedtime for 

dementia with behavioral disturbances.


Aricept 10 mg daily for dementia


Melatonin 10 mg at bedtime for insomnia


-When necessary Zyprexa for agitation/aggression.


-SW on board for discharge planning.  Encouraged the patient to participate in 

milieu.

## 2022-12-21 RX ADMIN — MIRTAZAPINE SCH MG: 15 TABLET, FILM COATED ORAL at 20:43

## 2022-12-21 RX ADMIN — ACETAMINOPHEN PRN MG: 325 TABLET, FILM COATED ORAL at 14:26

## 2022-12-21 RX ADMIN — Medication SCH MCG: at 11:22

## 2022-12-21 RX ADMIN — Medication SCH MG: at 20:43

## 2022-12-21 RX ADMIN — MAGNESIUM HYDROXIDE PRN MG: 2400 SUSPENSION ORAL at 17:18

## 2022-12-21 RX ADMIN — DONEPEZIL HYDROCHLORIDE SCH MG: 10 TABLET ORAL at 11:22

## 2022-12-21 NOTE — P.PN
Progress Note - Text


Progress Note Date: 12/21/22





Interval History:


Patient was seen in her room, sleeping comfortably accompanied by her one-to-one

supervisor. Patient has been noted to have occasional episodes of paranoia and 

agitation. As per note from early this morning, "Pt has remained awake with 

intermittent periods of laying in her bed. She has pulled apart her pillow and 

the insides. Food provided. Safety sitter with pt. She continues to accuse staff

of stealing from her and calling the names."


She continues to have intermittent episodes of agitation and calm. She has been 

directable and taking her medications and eating meals. She did require PRN for 

agitation last night. 





Mental Status Exam:


General Appearance: Patient appears to be stated age is sleeping calmly.  

Somnolent and comfortably sleeping at this time.  


Behavior: Patient is calmly sleeping.


Speech: Unable to assess.


Mood/Affect: Unable to assess.


Suicidality/Homicidality:  Unable to assess.


Perceptions: Unable to assess.


Though content/process: Unable to assess.


Memory and concentration: AOX1 - Self only. Concentration grossly intact but 

baseline poor.  


Judgment and insight: Poor at baseline.








                                   Vital Signs











Temp  98.1 F   12/18/22 06:28


 


Pulse  52 L  12/19/22 08:10


 


Resp  14   12/18/22 06:28


 


BP  126/68   12/19/22 08:10


 


Pulse Ox  99   12/17/22 06:31


 


FiO2      














AIMS = 1


Unable to draw a clock. Draws a [place] with a straight line next to it. When 

asked to write her name, just scribbles a straight vertical line.





Assessment


Major neurocognitive disorder with behavioral disturbances - hallucinations 

likely secondary to MNCD rather than primary thought disorder





Plan:


-Patient continues to meet criteria for inpatient psychiatric admission for 

symptom stabilization and safety. Patient has been petitioned and certified.  

Patient refused to defer mental health court.  Court scheduled for 12/22/2022.


-Medications: 


Change Seroquel 50 mg at 9 AM and 75 mg at bedtime for dementia with behavioral 

disturbances. Concern for bradycardia. 


Start Remeron 7.5 mg at bedtime for sleep regulation and appetite.


Aricept 10 mg daily for dementia


Melatonin 10 mg at bedtime for insomnia


-When necessary Zyprexa for agitation/aggression.


-SW on board for discharge planning.  Encouraged the patient to participate in 

milieu.

## 2022-12-22 RX ADMIN — MIRTAZAPINE SCH MG: 15 TABLET, FILM COATED ORAL at 20:13

## 2022-12-22 RX ADMIN — Medication SCH MG: at 20:14

## 2022-12-22 RX ADMIN — Medication SCH MCG: at 08:19

## 2022-12-22 RX ADMIN — ACETAMINOPHEN PRN ML: 160 SOLUTION ORAL at 15:46

## 2022-12-22 RX ADMIN — DONEPEZIL HYDROCHLORIDE SCH MG: 10 TABLET ORAL at 08:20

## 2022-12-22 NOTE — P.PN
Progress Note - Text


Progress Note Date: 12/22/22








Interval History:


Patient was seen in her room, sleeping comfortably accompanied by her one-to-one

supervisor.  Patient continues to be alert and oriented to self only.  The 

patient was noted to be very agitated and defensive during court this morning.  

Her initial words in court were "you stupid a** bi***."  She required much 

redirection.  She has been noted to sleep.  She has intermittent episodes of 

agitation, that particularly get worse around 3 PM.  Her pulse has improved.  

History continues to be limited to the patient's presentation and severe 

dementia.





Mental Status Exam:


General Appearance: Patient appears to be stated age is alert, however not 

directable or cooperative.


Behavior: Patient is initially, however noted to be agitated in the milieu and 

drinking court.


Speech: Unable to assess.


Mood/Affect: Unable to assess.


Suicidality/Homicidality:  Unable to assess.


Perceptions: Unable to assess.


Though content/process: Unable to assess.


Memory and concentration: AOX1 - Self only. Concentration grossly intact but 

baseline poor.  


Judgment and insight: Poor at baseline.





                                   Vital Signs











Temp  97.8 F   12/22/22 08:21


 


Pulse  60   12/22/22 08:21


 


Resp  18   12/22/22 08:21


 


BP  127/63   12/22/22 08:21


 


Pulse Ox  95   12/22/22 08:21


 


FiO2      








                                 Intake & Output











 12/21/22 12/22/22 12/22/22





 18:59 06:59 18:59


 


Weight   48.704 kg

















AIMS = 1


Unable to draw a clock. Draws a [place] with a straight line next to it. When 

asked to write her name, just scribbles a straight vertical line.





Assessment


Major neurocognitive disorder with behavioral disturbances - hallucinations 

likely secondary to MNCD rather than primary thought disorder





Plan:


-Patient continues to meet criteria for inpatient psychiatric admission for 

symptom stabilization and safety. Patient has been petitioned and certified.  

Patient refused to defer mental health court.  Patient is court ordered for 

medications and inpatient psychiatric hospitalization.


-Medications: 


Increase Seroquel to 75 mg by mouth twice a day


Continue Remeron 7.5 mg at bedtime for sleep regulation and appetite.


Aricept 10 mg daily for dementia


Melatonin 10 mg at bedtime for insomnia


-When necessary Zyprexa for agitation/aggression.


-SW on board for discharge planning.  Encouraged the patient to participate in 

[employer]. No

## 2022-12-23 RX ADMIN — MIRTAZAPINE SCH MG: 15 TABLET, FILM COATED ORAL at 19:33

## 2022-12-23 RX ADMIN — Medication SCH MCG: at 08:26

## 2022-12-23 RX ADMIN — DONEPEZIL HYDROCHLORIDE SCH MG: 10 TABLET ORAL at 08:25

## 2022-12-23 RX ADMIN — MAGNESIUM HYDROXIDE PRN MG: 2400 SUSPENSION ORAL at 08:27

## 2022-12-23 RX ADMIN — Medication SCH MG: at 19:33

## 2022-12-23 NOTE — P.PN
Progress Note - Text


Progress Note Date: 12/23/22








Interval History:


Patient was seen in her room, sleeping comfortably accompanied by her one-to-one

supervisor.  Patient continues to having intermittent episodes of agitation.  As

per staff, the patient did not sleep well last night.  She did receive her 

morning dose Zyprexa and only then was she able to finally calmed down and 

sleep.  She continues to respond to internal stimuli while attending individual 

and milieu activities.  She often requires much redirection.  The patient has 

also stopped ingesting her unsure shakes that she preferred to have her's as 

clear.





Mental Status Exam:


General Appearance: Patient appears to be stated age is currently resting in bed

comfortably.


Behavior: Patient is calmly resting in bed.


Speech: Unable to assess.


Mood/Affect: Unable to assess.


Suicidality/Homicidality:  Unable to assess.


Perceptions: Unable to assess.


Though content/process: Unable to assess.


Memory and concentration: AOX1 - Self only. Concentration grossly intact but 

baseline poor.  


Judgment and insight: Poor at baseline.





                                   Vital Signs











Temp  97.8 F   12/22/22 08:21


 


Pulse  60   12/22/22 08:21


 


Resp  18   12/22/22 08:21


 


BP  127/63   12/22/22 08:21


 


Pulse Ox  95   12/22/22 08:21


 


FiO2      








                                 Intake & Output











 12/22/22 12/23/22 12/23/22





 18:59 06:59 18:59


 


Weight 48.704 kg  

















AIMS = 1


Unable to draw a clock. Draws a [place] with a straight line next to it. When 

asked to write her name, just scribbles a straight vertical line.





Assessment


Major neurocognitive disorder with behavioral disturbances - hallucinations 

likely secondary to MNCD rather than primary thought disorder





Plan:


-Patient continues to meet criteria for inpatient psychiatric admission for 

symptom stabilization and safety. Patient has been petitioned and certified.  

Patient refused to defer mental health court.  Patient is court ordered for 

medications and inpatient psychiatric hospitalization.


-Medications: 


Discontinue Seroquel.  We will change her regimen to Zyprexa 5 mg by mouth twice

a day at 0900, 1400 and 7.5 mg daily at bedtime for mood


Continue Remeron 7.5 mg at bedtime for sleep regulation and appetite.


Aricept 10 mg daily for dementia


Melatonin 10 mg at bedtime for insomnia


-When necessary Zyprexa for agitation/aggression.


-SW on board for discharge planning.  Encouraged the patient to participate in 

milieu.

## 2022-12-24 RX ADMIN — Medication SCH MCG: at 08:39

## 2022-12-24 RX ADMIN — DONEPEZIL HYDROCHLORIDE SCH MG: 10 TABLET ORAL at 08:40

## 2022-12-24 RX ADMIN — MIRTAZAPINE SCH MG: 15 TABLET, FILM COATED ORAL at 20:04

## 2022-12-24 RX ADMIN — Medication SCH MG: at 20:03

## 2022-12-25 RX ADMIN — Medication SCH MG: at 20:05

## 2022-12-25 RX ADMIN — MAGNESIUM HYDROXIDE PRN MG: 2400 SUSPENSION ORAL at 08:32

## 2022-12-25 RX ADMIN — DONEPEZIL HYDROCHLORIDE SCH MG: 10 TABLET ORAL at 08:31

## 2022-12-25 RX ADMIN — MIRTAZAPINE SCH MG: 15 TABLET, FILM COATED ORAL at 20:05

## 2022-12-25 RX ADMIN — ACETAMINOPHEN PRN ML: 160 SOLUTION ORAL at 14:14

## 2022-12-25 RX ADMIN — Medication SCH MCG: at 08:31

## 2022-12-25 NOTE — PN
PROGRESS NOTE



DATE OF SERVICE:  12/24/2022



CHIEF COMPLAINT:

The patient was admitted for agitation and hallucinations.  She has underlying

diagnosis of advanced dementia.



INTERVAL HISTORY:

The patient has been doing fair.  She appears to be at a baseline.  She had a quiet day

yesterday.  She did receive Zyprexa 5 mg p.o. at 0530 yesterday for agitation.  For the

most part, she had a quiet day yesterday.  She did attend two groups and seemed to

manage fairly well in group.  She slept fairly well last night.  Today, she has been

up.  She spent some time in her room.  She has attended the 11 o'clock group this

morning.  She was noted to be compliant, concrete, and interacting minimally with

peers.  She attended the full group.  She did not have any significant behavioral

difficulties.  When I saw her, she was in her room.  She continues on one to ones.  She

had made various comments about things, though it was not clear what she was talking

about.  She appeared to be tolerating her psychotropic medication well.



MENTAL STATUS EXAM:

Patient was somewhat restless.  She gave fair eye contact, it was not clear.  She

acknowledged my presence even though we stood face-to-face.  She did make some random

comments though her thought seemed disorganized.  She tended to talk in a soft at times

almost airy voice.  Her affect was a little constricted though she smiled some.  She

had a quiet mood.  She did not appear to be distressed or restless.  She continues to

show indications of responding to internal stimuli.  I made no indications of thought

of harm.  She is oriented to self and surroundings.



ASSESSMENT:

I will continue the current diagnosis and treatment plan.  Overall, according to staff

report, she appears to be doing somewhat better overall.  She has not had significant

behavioral issues yesterday nor today.  We will continue Remeron 7.5 mg at nightly and

Zyprexa 5 mg b.i.d. and 7.5 mg nightly.  We will need to work with her daughter in

regard to discharge planning for possible referral.  It is noted that the patient is on

Aricept.  Given her advanced dementia, Aricept does not have an indication and it may

be a potential complication that could induce some of the difficult behaviors that the

patient has had.  It would be appropriate to consider discontinuing Aricept.  We will

focus on stabilization and discharge planning.





MMODL / IJN: [id number] / Job#: [id number]

## 2022-12-26 VITALS — RESPIRATION RATE: 16 BRPM

## 2022-12-26 RX ADMIN — Medication SCH MG: at 20:06

## 2022-12-26 RX ADMIN — DONEPEZIL HYDROCHLORIDE SCH MG: 10 TABLET ORAL at 10:28

## 2022-12-26 RX ADMIN — MIRTAZAPINE SCH MG: 15 TABLET, FILM COATED ORAL at 20:06

## 2022-12-26 RX ADMIN — Medication SCH MCG: at 10:29

## 2022-12-26 NOTE — PN
PROGRESS NOTE



DATE OF SERVICE:  12/25/2022



CHIEF COMPLAINT:

The patient was admitted for agitation and visual hallucinations.  She has a history of

dementia.



INTERVAL HISTORY:

The patient has been doing fairly well.  She had a quiet day yesterday.  She spent most

of the day in her room.  She continues on one-to-one staffing.  She did attend one

group yesterday.  She was quiet, though appropriate in the group.  She will respond to

staff, does not initiate much in terms of conversation or expressing any thoughts or

concerns.  Staff indicated yesterday that she seemed to be resting more comfortably.

Today, she is doing about the same.  Much in the morning, she has been in her room.

She continues on one-to-one.  When I talked to her she did not express any immediate

concerns or complaints.  I asked her how she was sleeping, she said poorly, which is

not what staff had observed.  She said her appetite was okay.  Other than that she did

not answer many other questions.  She did not seem to indicate any problems or concerns

with her psychotropic medications.



MENTAL STATUS EXAM:

The patient was in her room.  She was standing and did move about a little bit.  She

gave fairly good eye contact.  She responded to a few questions.  She spoke in a soft

monotone voice.  She gave some answers that seemed disconnected.  She answered directly

to a few questions in an appropriate way.  Her affect was blunted.  She tended to have

a staring gaze and did not show much emotional response 1 way or another.  Her mood was

reserved.  She did not appear to be significantly distressed.  It was difficult to

assess for thought disorder.  She made no indication of thoughts of harm.  She was

oriented to her immediate environment and circumstances.



ASSESSMENT:

I will continue current diagnosis and treatment plan.  I will continue psychotropic

medications the same.  I encouraged the patient to let staff know or talk to me at any

point if she has any questions or concerns.  We will focus on stabilization and

discharge planning.





MMODL / IJN: [id number] / Job#: [id number]

## 2022-12-27 VITALS — HEART RATE: 66 BPM | SYSTOLIC BLOOD PRESSURE: 108 MMHG | DIASTOLIC BLOOD PRESSURE: 68 MMHG

## 2022-12-27 VITALS — TEMPERATURE: 98 F

## 2022-12-27 LAB
ANION GAP SERPL CALC-SCNC: 5 MMOL/L
BUN SERPL-SCNC: 24 MG/DL (ref 7–17)
CALCIUM SPEC-MCNC: 9.1 MG/DL (ref 8.4–10.2)
CHLORIDE SERPL-SCNC: 107 MMOL/L (ref 98–107)
CO2 SERPL-SCNC: 29 MMOL/L (ref 22–30)
GLUCOSE SERPL-MCNC: 99 MG/DL (ref 74–99)
POTASSIUM SERPL-SCNC: 5.2 MMOL/L (ref 3.5–5.1)
SODIUM SERPL-SCNC: 141 MMOL/L (ref 137–145)

## 2022-12-27 RX ADMIN — MAGNESIUM HYDROXIDE PRN MG: 2400 SUSPENSION ORAL at 11:27

## 2022-12-27 RX ADMIN — Medication SCH MCG: at 08:56

## 2022-12-27 RX ADMIN — DONEPEZIL HYDROCHLORIDE SCH MG: 10 TABLET ORAL at 08:56

## 2022-12-27 NOTE — P.DS
Providers


Date of admission: 


12/14/22 22:53





Expected date of discharge: 12/27/22


Attending physician: 


Robert Lanza MD





Consults: 





                                        





12/14/22 22:56


Consult Physician Routine 


   Consulting Provider: [facility]


   Consult Reason/Comments: H&P


   Do you want consulting provider notified?: Yes











Primary care physician: 


Physician Nonstaff








- Discharge Diagnosis(es)


(1) Severe major neurocognitive disorder due to Alzheimer's disease with 

behavioral disturbance


Current Visit: Yes   Status: Acute   Priority: High   


Hospital Course: 





Admission HPI:


Patient is a 67-year-old single, retired, -American female who presented 

to our hospital brought in by family for visual hallucinations.





Patient presented to the hospital on 12/14/2022, brought in by her daughter with

a chief complaint of hallucinations.  Reportedly, the patient has been declining

over the past 5 weeks.  She was noted by her daughter to be quite agitated at 

home and at times destructive.  The patient was hospitalized for approximately 4

days at [facility] approximately a month ago and medications were adjusted however

this did not help with her symptoms.  The patient was also recently in a nursing

home however was signed out AGAINST MEDICAL ADVICE by the patient's daughter who

felt that the nursing home was inappropriate and not providing the care that the

patient needed.


The patient was diagnosed with major neurocognitive disorder in 2018.  She was 

recently started on donepezil November 2022.  As per EPS report, the patient was

started on Seroquel and this was increased in order to address her insomnia and 

behavioral disturbances.  However, this medication did not help and the patient 

has been displaying behavior such as yelling, cursing, and throwing objects 

since November.  Reportedly, the patient has been hallucinating and seeing "a 

man or woman who thinks they are trying to take off her shoes, take her stuff, 

or trying to fight her."  The patient was subsequently admitted on to the 

psychiatric unit.


The patient is currently on one-to-one supervision for falls.  Upon evaluation 

by this provider, the patient is very suspicious stating that everyone here is 

attempting to steal her money.  She is currently alert and oriented to self 

only.  She has been noted by this provider and by staff to attempt to disrobe 

while in the milieu.  She has had intermittent episodes of agitation, crying, 

and yelling.  The patient did require multiple IM medications yesterday in order

to sleep.  The patient has been noted to sleep very poorly.  At baseline, the 

patient is noted to be able to bathe and care for her needs with her daughter 

assisting when necessary.  The patient is a very poor historian at this time.








Patient has a history of dementia since 2018.  The patient's home medications 

include Seroquel however patient's daughter reported that this medication was 

not beneficial.  Unable to determine any previous psychiatric admissions.  

Unable to determine any other psychiatric history as the patient is a poor 

historian.





Hospital course:


Upon admission to the unit patient was initially noted to be easily agitated, 

confused, and alert and oriented to self only.  The patient also appeared to 

respond to internal stimuli.  The patient was petitioned and subsequently 

certified.  Diagnosis of major neurocognitive disorder with behavioral 

disturbances. Patient however intermittently adherent to her medications and 

eventually fully adherent. The patient was also placed on one-to-one supervision

due to her risks for falls and impulsive behaviors.  She was started on a 

regimen of Seroquel initially appeared to develop some bradycardia.  Therefore, 

the patient is transitioned to Zyprexa which she tolerated much better.  The 

patient was court ordered for mental health treatment on 12/22/2022.  On her 

regimen of Zyprexa, the patient displayed gradual improvement in regards to her 

target symptoms of acute behaviors in response to internal stimuli.  Remeron was

added to her regimen in order to address poor appetite and insomnia.  Melatonin 

was added in order to address circadian rhythm stabilization and insomnia.  At 

baseline, the patient has very severe dementia and is alert and oriented to self

only.  Her prognosis is poor as dementia will gradually worsened over time.  

However, on the day of discharge, the patient is not presenting with any acute 

behaviors.  She continues to be alert and oriented to self only.  She has been 

eating and sleeping well.  She reports no suicidal or homicidal ideation.  She 

reports no auditory or visual hallucinations.  She does not appear to respond to

internal stimuli.  She has been tolerating her medications well.  The patient 

was also seen by medical team for history and physical examination during this 

admission.  Her vital signs appear stable.  Prior to discharge, family meeting 

was arranged by  to answer any questions and ensure safety.





Mental status exam:


General Appearance: Patient appears to be stated age is alert, pleasant, and 

cooperative. Patient is in no acute distress and has fair hygiene and grooming 


Behavior: Patient is calmly seated without any agitated behavior.


Speech: Patient's speech is fluent and nonpressured. 


Mood/Affect: Patient reports their mood is "okay", affect is congruent and 

constricted.  Mildly irritable.


Suicidality/Homicidality:  Patient denies having any suicidal or homicidal 

ideation intent or plan.  


Perceptions: Patient denies any auditory or visual hallucinations.  


Though content/process: There is no evidence of any delusional thought content 

and thought process is linear and goal-directed. 


Memory and concentration: Very poor at baseline secondary to advanced dementia


Judgment and insight: Poor at baseline.  Guarded prognosis.





Impression:


Major neurocognitive disorder with behavioral disturbances





Plan:


-Continue with discharge today as patient has improved and stabilized 

psychiatrically and is not currently an imminent threat to herself and/or 

others.  Patient remain at chronically elevated risk due to dementia.  Prognosis

is guarded as dementia will gradually worsened over time.


-Continue medications: 


Zyprexa 5 mg by mouth twice a day at 9 AM and 2 PM.  Zyprexa 7.5 mg at bedtime. 

- For mood stabilization


Aricept 10 mg by mouth daily for dementia


Melatonin 10 mg by mouth at bedtime for insomnia and circadian rhythm 

stabilization


Remeron 7.5 mg by mouth at bedtime for depression/anxiety/insomnia/appetite 

stimulation


-Patient was counseled on the need for medication compliance and appropriate 

follow-up at mental health and also primary care for medical issues.  Patient 

verbalized understanding and agreed.


-Social work to arrange for and conduct family meeting to ensure safety upon 

discharge and answer any questions/concerns. Social work also to arrange for 

patients follow up appointments for psychiatric care along with follow up with 

primary care provider.


-Patient's family was instructed to return the patient to the hospital or seek 

immediate medical care if their psychiatric or medical symptoms do worsen or 

reoccur.


-Psychoeducation and supportive therapy provided to patient and family .  Risks 

and benefits of pharmacological treatment versus the risks and benefits of 

nontreatment weight and discussed.  Informed consent discussion held with 

family.  Common side effects of psychotropics discussed such as, but not limited

to headache, GI disturbance, sexual dysfunction, movement disorders, sedation, 

and orthostatic hypotension.  Life threatening and blackbox warnings of 

prescribed medications also discussed.  Potential risks of operating a vehicle 

or heavy machinery discussed with patient at length.  Advised on importance of 

compliance and a reliable and responsible manner. Patient advised to review FDA 

consumer labeling of all medications prior to taking.  Patient's family  

verbalized understanding of potential risks, and agrees with current treatment 

plan.  Family advised to medically contact physician/emergency personnel if any 

acute changes in condition occur.








                                   Vital Signs











Temp  98.0 F   12/27/22 01:51


 


Pulse  66   12/27/22 08:57


 


Resp  16   12/27/22 01:51


 


BP  108/68   12/27/22 08:57


 


Pulse Ox  97   12/27/22 01:51


 


FiO2      








                                 Intake & Output











 12/26/22 12/27/22 12/27/22





 18:59 06:59 18:59


 


Intake Total 200  


 


Balance 200  


 


Intake:   


 


  Oral 200  











                               Laboratory Results











WBC  8.0 k/uL (3.8-10.6)   12/14/22  19:52    


 


RBC  4.38 m/uL (3.80-5.40)   12/14/22  19:52    


 


Hgb  13.4 gm/dL (11.4-16.0)   12/14/22  19:52    


 


Hct  40.3 % (34.0-46.0)   12/14/22  19:52    


 


MCV  92.1 fL (80.0-100.0)   12/14/22  19:52    


 


MCH  30.5 pg (25.0-35.0)   12/14/22  19:52    


 


MCHC  33.2 g/dL (31.0-37.0)   12/14/22  19:52    


 


RDW  13.1 % (11.5-15.5)   12/14/22  19:52    


 


Plt Count  307 k/uL (150-450)   12/14/22  19:52    


 


MPV  8.5   12/14/22  19:52    


 


Neutrophils %  62 %  12/14/22  19:52    


 


Lymphocytes %  27 %  12/14/22  19:52    


 


Monocytes %  7 %  12/14/22  19:52    


 


Eosinophils %  1 %  12/14/22  19:52    


 


Basophils %  1 %  12/14/22  19:52    


 


Neutrophils #  5.0 k/uL (1.3-7.7)   12/14/22  19:52    


 


Lymphocytes #  2.2 k/uL (1.0-4.8)   12/14/22  19:52    


 


Monocytes #  0.5 k/uL (0-1.0)   12/14/22  19:52    


 


Eosinophils #  0.1 k/uL (0-0.7)   12/14/22  19:52    


 


Basophils #  0.1 k/uL (0-0.2)   12/14/22  19:52    


 


Sodium  141 mmol/L (137-145)   12/27/22  07:17    


 


Potassium  5.2 mmol/L (3.5-5.1)  H  12/27/22  07:17    


 


Chloride  107 mmol/L ()   12/27/22  07:17    


 


Carbon Dioxide  29 mmol/L (22-30)   12/27/22  07:17    


 


Anion Gap  5 mmol/L  12/27/22  07:17    


 


BUN  24 mg/dL (7-17)  H  12/27/22  07:17    


 


Creatinine  1.08 mg/dL (0.52-1.04)  H  12/27/22  07:17    


 


Est GFR (CKD-EPI)AfAm  62  (>60 ml/min/1.73 sqM)   12/27/22  07:17    


 


Est GFR (CKD-EPI)NonAf  54  (>60 ml/min/1.73 sqM)   12/27/22  07:17    


 


Glucose  99 mg/dL (74-99)   12/27/22  07:17    


 


Estimated Ave Glu mg/dL  128   12/14/22  19:52    


 


Hemoglobin A1c  6.1 % (0.0-6.0)  H  12/14/22  19:52    


 


Calcium  9.1 mg/dL (8.4-10.2)   12/27/22  07:17    


 


Vitamin B12  395.0 pg/mL (200.0-944.0)   12/14/22  19:52    


 


Folate  6.60 ng/mL (4.40-31.00)   12/14/22  19:52    


 


TSH  1.780 mIU/L (0.465-4.680)   12/14/22  19:52    


 


Urine Color  Light Yellow   12/14/22  19:52    


 


Urine Appearance  Clear  (Clear)   12/14/22  19:52    


 


Urine pH  5.0  (5.0-8.0)   12/14/22  19:52    


 


Ur Specific Gravity  1.006  (1.001-1.035)   12/14/22  19:52    


 


Urine Protein  Negative  (Negative)   12/14/22  19:52    


 


Urine Glucose (UA)  Negative  (Negative)   12/14/22  19:52    


 


Urine Ketones  Negative  (Negative)   12/14/22  19:52    


 


Urine Blood  Trace  (Negative)  H  12/14/22  19:52    


 


Urine Nitrite  Negative  (Negative)   12/14/22  19:52    


 


Urine Bilirubin  Negative  (Negative)   12/14/22  19:52    


 


Urine Urobilinogen  <2.0 mg/dL (<2.0)   12/14/22  19:52    


 


Ur Leukocyte Esterase  Negative  (Negative)   12/14/22  19:52    


 


Urine RBC  1 /hpf (0-5)   12/14/22  19:52    


 


Urine WBC  2 /hpf (0-5)   12/14/22  19:52    


 


Ur Squamous Epith Cells  <1 /hpf (0-4)   12/14/22  19:52    


 


Urine Mucus  Rare /hpf (None)  H  12/14/22  19:52    


 


Salicylates  <1.0 mg/dL  12/14/22  19:52    


 


Urine Opiates Screen  Not Detected  (NotDetected)   12/14/22  19:52    


 


Ur Oxycodone Screen  Not Detected  (NotDetected)   12/14/22  19:52    


 


Urine Methadone Screen  Not Detected  (NotDetected)   12/14/22  19:52    


 


Ur Propoxyphene Screen  Not Detected  (NotDetected)   12/14/22  19:52    


 


Acetaminophen  <10.0 ug/mL  12/14/22  19:52    


 


Ur Barbiturates Screen  Not Detected  (NotDetected)   12/14/22  19:52    


 


U Tricyclic Antidepress  Detected  (NotDetected)  H  12/14/22  19:52    


 


Ur Phencyclidine Scrn  Not Detected  (NotDetected)   12/14/22  19:52    


 


Ur Amphetamines Screen  Not Detected  (NotDetected)   12/14/22  19:52    


 


U Methamphetamines Scrn  Not Detected  (NotDetected)   12/14/22  19:52    


 


U Benzodiazepines Scrn  Not Detected  (NotDetected)   12/14/22  19:52    


 


Urine Cocaine Screen  Not Detected  (NotDetected)   12/14/22  19:52    


 


U Marijuana (THC) Screen  Not Detected  (NotDetected)   12/14/22  19:52    


 


Serum Alcohol  <10 mg/dL  12/14/22  19:52    


 


Coronavirus (PCR)  Not Detected  (Not Detectd)   12/14/22  21:59    











                                    Allergies











Allergy/AdvReac Type Severity Reaction Status Date / Time


 


No Known Allergies Allergy   Verified 12/14/22 21:23











Patient Condition at Discharge: Stable





Plan - Discharge Summary


Discharge Rx Participant: No


New Discharge Prescriptions: 


New


   Donepezil [Aricept] 10 mg PO DAILY 30 Days  tab


   Melatonin 10 mg PO HS 30 Days  tab


   Mirtazapine [Remeron] 7.5 mg PO HS 30 Days  tab


   OLANZapine [ZyPREXA] 5 mg PO BID@0900,1400 30 Days  tab


   OLANZapine [ZyPREXA] 7.5 mg PO HS 30 Days  tab





Continue


   amLODIPine [Norvasc] 10 mg PO DAILY


   atenoloL [Tenormin] 50 mg PO BID


   Cholecalciferol [Vitamin D3 (25 Mcg = 1000 Iu)] 50 mcg PO DAILY





Discontinued


   QUEtiapine [SEROquel] 50 mg PO BID


   Donepezil [Aricept] 10 mg PO DAILY


Discharge Medication List





Cholecalciferol [Vitamin D3 (25 Mcg = 1000 Iu)] 50 mcg PO DAILY 12/14/22 

[History]


amLODIPine [Norvasc] 10 mg PO DAILY 12/14/22 [History]


atenoloL [Tenormin] 50 mg PO BID 12/14/22 [History]


Donepezil [Aricept] 10 mg PO DAILY 30 Days  tab 12/27/22 [Rx]


Melatonin 10 mg PO HS 30 Days  tab 12/27/22 [Rx]


Mirtazapine [Remeron] 7.5 mg PO HS 30 Days  tab 12/27/22 [Rx]


OLANZapine [ZyPREXA] 5 mg PO BID@0900,1400 30 Days  tab 12/27/22 [Rx]


OLANZapine [ZyPREXA] 7.5 mg PO HS 30 Days  tab 12/27/22 [Rx]








Follow up Appointment(s)/Referral(s): 


Lenore Thurston [Other] - 01/25/23 1:00 pm


Nonstaff,Physician [Primary Care Provider] - 1-2 days


Patient Instructions/Handouts:  Dementia (ED), Hallucinations (DC)


Activity/Diet/Wound Care/Special Instructions: 


Avoid the use of street drugs and alcohol.  Take all prescriptions as 

prescribed.  When you are in need of refills on your medications, please contact

your medical provider and/or outpatient psychiatrist to have this done.  Please 

go to scheduled outpatient appointment for aftercare treatment.  If symptoms 

return or become worse, call the crisis line at [phone] and/or go to the 

nearest emergency room for evaluation


Discharge Disposition: HOME SELF-CARE

## 2022-12-27 NOTE — PN
PROGRESS NOTE



DATE OF SERVICE:  12/26/2022



CHIEF COMPLAINT:

The patient was admitted for agitation and visual hallucinations.  She has a history of

dementia.



INTERVAL HISTORY:

The patient appears to be at her baseline.  She had a quiet day yesterday.  Mostly, she

stays in her room, though from time to time, she comes out and walks around the unit.

She comes out for meals and has good appetite.  She is not having any issues with

ambulation.  Though she is on Zyprexa, she is not showing any signs of EPS and in

particular, does not show tremor, abnormal movements, or rigidity.  She slept fair last

night by staff report though she says she does not sleep much at all, though staff note

that she has been doing better and getting rest both in the daytime as well as at

night.  Today, she continues the same.  When I saw her, she was in her room.  She was

awake.  She gave good eye contact and responded to questions with just 1 word

responses.  She had no specific complaints or concerns other than making a statement

that she does not sleep at night.  She only said that after I asked how she has been

sleeping, she appears to tolerate her psychotropic medications.



MENTAL STATUS EXAM:

The patient gave good eye contact.  She almost seemed to have a staring gaze.  She

answered questions with 1 or 2 word responses.  Her affect was blunted.  She did not

show much emotional expression 1 way or another.  Her mood was quiet.  She did appear

to be distressed.  She was not restless in any way.  There was no indication of thought

disorder.  There was no indication of thoughts of harm.  She is oriented to her

environment.



ASSESSMENT:

I will continue the current diagnosis and treatment plan.  I will continue psychotropic

medications the same.  I had a telephone conversation with the patient's

daughter/guardian.  I discussed that we anticipate the patient being discharged in the

next day or 2 as she seems to be stable and in a reasonable frame of mind to be out of

the psychiatric unit.  I indicated that Social Work would be coordinating with the

daughter to make arrangements for discharge.  I will order a BMP for the morning.  We

will focus on stabilization and discharge planning.



MMDAPHNEL / BETHELN: [id number] / Job#: [id number]